# Patient Record
Sex: MALE | Race: WHITE
[De-identification: names, ages, dates, MRNs, and addresses within clinical notes are randomized per-mention and may not be internally consistent; named-entity substitution may affect disease eponyms.]

---

## 2018-09-09 ENCOUNTER — HOSPITAL ENCOUNTER (EMERGENCY)
Dept: HOSPITAL 96 - M.ERS | Age: 48
Discharge: LEFT BEFORE DISCHARGE | End: 2018-09-09
Payer: COMMERCIAL

## 2018-09-09 VITALS — DIASTOLIC BLOOD PRESSURE: 103 MMHG | SYSTOLIC BLOOD PRESSURE: 146 MMHG

## 2018-09-09 VITALS — HEIGHT: 70 IN | WEIGHT: 185.01 LBS | BODY MASS INDEX: 26.49 KG/M2

## 2018-09-09 DIAGNOSIS — R07.89: Primary | ICD-10-CM

## 2018-09-09 DIAGNOSIS — F15.10: ICD-10-CM

## 2018-09-09 DIAGNOSIS — Z88.1: ICD-10-CM

## 2018-09-09 DIAGNOSIS — L03.113: ICD-10-CM

## 2018-09-09 DIAGNOSIS — Z88.0: ICD-10-CM

## 2018-09-10 NOTE — EKG
[place] [address]
Phone:  [phone]                     ELECTROCARDIOGRAM REPORT      
_______________________________________________________________________________
 
Name:       AICHA LEONARD JR           [facility]                      [facility]  [id number]      Account #:      [id number]  
Admission:  18     Attend Phys:                         
Discharge:  18     Date of Birth:  [birth year]  
         Report #: [id number]
    [id number]
_______________________________________________________________________________
THIS REPORT FOR:  //name//                      
 
                         [facility] ED
                                       
Test Date:    2018               Test Time:    11:51:07
Pat Name:     AICHA LEONARD            Department:   
Patient ID:   [id number]            Room:          
Gender:       M                        Technician:   AL
:          1970               Requested By: Jamshid Gonzalez
Order Number: [id number] MD:   Jose Luis Rm
                                 Measurements
Intervals                              Axis          
Rate:         121                      P:            38
NE:           129                      QRS:          -26
QRSD:         88                       T:            56
QT:           331                                    
QTc:          470                                    
                           Interpretive Statements
Sinus tachycardia
Abnormal R-wave progression, early transition
Left ventricular hypertrophy
No previous ECG available for comparison
 
Electronically Signed On 9- 11:00:53 CDT by Jose Luis Rm
[email or web address]
 
 
 
 
 
 
 
 
 
 
 
 
 
 
 
 
 
 
  <ELECTRONICALLY SIGNED>
                                           By: Jose Luis Rm MD, [facility]      
  09/10/18     1100
D: 18 1151   _____________________________________
T: 18 1151   Jose Luis Rm MD, FACC        /EPI

## 2020-01-22 ENCOUNTER — HOSPITAL ENCOUNTER (EMERGENCY)
Dept: HOSPITAL 96 - M.ERS | Age: 50
Discharge: HOME | End: 2020-01-22
Payer: COMMERCIAL

## 2020-01-22 DIAGNOSIS — Z53.21: Primary | ICD-10-CM

## 2021-07-03 ENCOUNTER — HOSPITAL ENCOUNTER (EMERGENCY)
Dept: HOSPITAL 96 - M.ERS | Age: 51
Discharge: HOME | End: 2021-07-03
Payer: COMMERCIAL

## 2021-07-03 VITALS — BODY MASS INDEX: 28.63 KG/M2 | HEIGHT: 70 IN | WEIGHT: 200 LBS

## 2021-07-03 VITALS — SYSTOLIC BLOOD PRESSURE: 135 MMHG | DIASTOLIC BLOOD PRESSURE: 88 MMHG

## 2021-07-03 DIAGNOSIS — Z88.0: ICD-10-CM

## 2021-07-03 DIAGNOSIS — Z88.1: ICD-10-CM

## 2021-07-03 DIAGNOSIS — T78.49XA: ICD-10-CM
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
## 2021-07-03 DIAGNOSIS — L53.9: Primary | ICD-10-CM

## 2021-12-15 ENCOUNTER — HOSPITAL ENCOUNTER (EMERGENCY)
Dept: HOSPITAL 96 - M.ERS | Age: 51
Discharge: HOME | End: 2021-12-15
Payer: COMMERCIAL

## 2021-12-15 VITALS — HEIGHT: 70 IN | WEIGHT: 200 LBS | BODY MASS INDEX: 28.63 KG/M2

## 2021-12-15 VITALS — SYSTOLIC BLOOD PRESSURE: 114 MMHG | DIASTOLIC BLOOD PRESSURE: 92 MMHG

## 2021-12-15 DIAGNOSIS — U07.1: Primary | ICD-10-CM

## 2021-12-15 DIAGNOSIS — Z88.0: ICD-10-CM

## 2021-12-15 DIAGNOSIS — M79.18: ICD-10-CM

## 2021-12-15 DIAGNOSIS — M19.90: ICD-10-CM

## 2021-12-15 DIAGNOSIS — Z79.899: ICD-10-CM

## 2021-12-15 DIAGNOSIS — Z88.1: ICD-10-CM

## 2021-12-15 LAB
BILIRUB UR-MCNC: NEGATIVE MG/DL
COLOR UR: YELLOW
KETONES UR STRIP-MCNC: NEGATIVE MG/DL
NITRITE UR QL STRIP: NEGATIVE
PROT UR QL STRIP: (no result)
RBC # UR STRIP: NEGATIVE /UL
SP GR UR STRIP: >= 1.03 (ref 1–1.03)
URINE CLARITY: CLEAR
URINE GLUCOSE-RANDOM: NEGATIVE
URINE LEUKOCYTES: NEGATIVE
UROBILINOGEN UR STRIP-ACNC: 0.2 E.U./DL (ref 0.2–1)

## 2021-12-16 ENCOUNTER — HOSPITAL ENCOUNTER (EMERGENCY)
Dept: HOSPITAL 96 - M.ERS | Age: 51
Discharge: HOME | End: 2021-12-16
Payer: COMMERCIAL

## 2021-12-16 VITALS — SYSTOLIC BLOOD PRESSURE: 120 MMHG | DIASTOLIC BLOOD PRESSURE: 70 MMHG

## 2021-12-16 VITALS — BODY MASS INDEX: 28.63 KG/M2 | WEIGHT: 200 LBS | HEIGHT: 70 IN

## 2021-12-16 DIAGNOSIS — Z88.8: ICD-10-CM

## 2021-12-16 DIAGNOSIS — Z88.0: ICD-10-CM

## 2021-12-16 DIAGNOSIS — M54.59: Primary | ICD-10-CM

## 2022-02-11 ENCOUNTER — HOSPITAL ENCOUNTER (EMERGENCY)
Dept: HOSPITAL 96 - M.ERS | Age: 52
Discharge: HOME | End: 2022-02-11
Payer: COMMERCIAL

## 2022-02-11 VITALS — WEIGHT: 200 LBS | HEIGHT: 70 IN | BODY MASS INDEX: 28.63 KG/M2

## 2022-02-11 VITALS — DIASTOLIC BLOOD PRESSURE: 89 MMHG | SYSTOLIC BLOOD PRESSURE: 121 MMHG

## 2022-02-11 DIAGNOSIS — Z79.899: ICD-10-CM

## 2022-02-11 DIAGNOSIS — R51.9: ICD-10-CM

## 2022-02-11 DIAGNOSIS — M19.90: ICD-10-CM

## 2022-02-11 DIAGNOSIS — R05.9: ICD-10-CM

## 2022-02-11 DIAGNOSIS — R43.8: ICD-10-CM

## 2022-02-11 DIAGNOSIS — Z88.0: ICD-10-CM

## 2022-02-11 DIAGNOSIS — Z88.1: ICD-10-CM

## 2022-02-11 DIAGNOSIS — R50.9: Primary | ICD-10-CM

## 2022-02-11 DIAGNOSIS — R06.02: ICD-10-CM

## 2022-02-11 DIAGNOSIS — Z20.822: ICD-10-CM

## 2022-02-22 ENCOUNTER — HOSPITAL ENCOUNTER (EMERGENCY)
Dept: HOSPITAL 96 - M.ERS | Age: 52
Discharge: LEFT BEFORE DISCHARGE | End: 2022-02-22
Payer: COMMERCIAL

## 2022-02-22 VITALS — WEIGHT: 200 LBS | BODY MASS INDEX: 28.63 KG/M2 | HEIGHT: 70 IN

## 2022-02-22 VITALS — SYSTOLIC BLOOD PRESSURE: 145 MMHG | DIASTOLIC BLOOD PRESSURE: 107 MMHG

## 2022-02-22 DIAGNOSIS — Z88.1: ICD-10-CM

## 2022-02-22 DIAGNOSIS — M19.90: ICD-10-CM

## 2022-02-22 DIAGNOSIS — R07.89: ICD-10-CM

## 2022-02-22 DIAGNOSIS — Z88.0: ICD-10-CM

## 2022-02-22 DIAGNOSIS — F41.9: Primary | ICD-10-CM

## 2022-02-22 DIAGNOSIS — F17.210: ICD-10-CM

## 2022-02-22 LAB
ABSOLUTE BASOPHILS: 0 THOU/UL (ref 0–0.2)
ABSOLUTE EOSINOPHILS: 0.1 THOU/UL (ref 0–0.7)
ABSOLUTE MONOCYTES: 1 THOU/UL (ref 0–1.2)
ALBUMIN SERPL-MCNC: 4.8 G/DL (ref 3.4–5)
ALP SERPL-CCNC: 95 U/L (ref 46–116)
ALT SERPL-CCNC: 65 U/L (ref 30–65)
ANION GAP SERPL CALC-SCNC: 11 MMOL/L (ref 7–16)
AST SERPL-CCNC: 24 U/L (ref 15–37)
BASOPHILS NFR BLD AUTO: 0.3 %
BILIRUB SERPL-MCNC: 0.4 MG/DL
BUN SERPL-MCNC: 26 MG/DL (ref 7–18)
CALCIUM SERPL-MCNC: 9.6 MG/DL (ref 8.5–10.1)
CHLORIDE SERPL-SCNC: 100 MMOL/L (ref 98–107)
CO2 SERPL-SCNC: 25 MMOL/L (ref 21–32)
CREAT SERPL-MCNC: 0.9 MG/DL (ref 0.6–1.3)
EOSINOPHIL NFR BLD: 0.9 %
GLUCOSE SERPL-MCNC: 109 MG/DL (ref 70–99)
GRANULOCYTES NFR BLD MANUAL: 64.4 %
HCT VFR BLD CALC: 49.2 % (ref 42–52)
HGB BLD-MCNC: 16.7 GM/DL (ref 14–18)
LIPASE: 98 U/L (ref 73–393)
LYMPHOCYTES # BLD: 2.7 THOU/UL (ref 0.8–5.3)
LYMPHOCYTES NFR BLD AUTO: 25.2 %
MAGNESIUM SERPL-MCNC: 2.3 MG/DL (ref 1.8–2.4)
MCH RBC QN AUTO: 29.6 PG (ref 26–34)
MCHC RBC AUTO-ENTMCNC: 34 G/DL (ref 28–37)
MCV RBC: 87 FL (ref 80–100)
MONOCYTES NFR BLD: 9.2 %
MPV: 6.8 FL. (ref 7.2–11.1)
NEUTROPHILS # BLD: 7 THOU/UL (ref 1.6–8.1)
NT-PRO BRAIN NAT PEPTIDE: 149 PG/ML (ref ?–300)
NUCLEATED RBCS: 0 /100WBC
PLATELET COUNT*: 296 THOU/UL (ref 150–400)
POTASSIUM SERPL-SCNC: 3.9 MMOL/L (ref 3.5–5.1)
PROT SERPL-MCNC: 8.6 G/DL (ref 6.4–8.2)
RBC # BLD AUTO: 5.66 MIL/UL (ref 4.5–6)
RDW-CV: 14.7 % (ref 10.5–14.5)
SODIUM SERPL-SCNC: 136 MMOL/L (ref 136–145)
WBC # BLD AUTO: 10.8 THOU/UL (ref 4–11)

## 2022-02-23 NOTE — EKG
[place] [address]
Phone:  [phone]                     ELECTROCARDIOGRAM REPORT      
_______________________________________________________________________________
 
Name:         AICHA LEONARD JR          Room:                     [facility]#:    [id number]     Account #:     [id number]  
Admission:    22    Attend Phys:                     
Discharge:    22    Date of Birth: [birth year]  
Date of Service: 22 1649  Report #:      [id number]
        [id number]
_______________________________________________________________________________
THIS REPORT FOR:  //name//                      
 
                         [facility] ED
                                       
Test Date:    2022               Test Time:    16:49:43
Pat Name:     AICHA LEONARD            Department:   
Patient ID:   [id number]            Room:          
Gender:                               Technician:   [facility]
:          1970               Requested By: Jamshid Gonzalez
Order Number: [id number] MD:   Jose Luis Rm
                                 Measurements
Intervals                              Axis          
Rate:         96                       P:            29
VA:           140                      QRS:          7
QRSD:         82                       T:            58
QT:           354                                    
QTc:          448                                    
                           Interpretive Statements
Sinus rhythm
Probable left atrial enlargement
Baseline wander in lead(s) II
Compared to ECG 2018 11:51:07
Sinus tachycardia no longer present
Left ventricular hypertrophy no longer present
Electronically Signed On 2022 8:58:07 CST by Jose Luis Rm
[email or web address]
 
 
 
 
 
 
 
 
 
 
 
 
 
 
 
 
 
 
  <ELECTRONICALLY SIGNED>
                                           By: Jose Luis Rm MD, [facility]      
  22     0858
D: 22 1649   _____________________________________
T: 22 1649   Jose Luis Rm MD, [facility]        /EPI